# Patient Record
Sex: MALE | Race: BLACK OR AFRICAN AMERICAN | NOT HISPANIC OR LATINO | URBAN - METROPOLITAN AREA
[De-identification: names, ages, dates, MRNs, and addresses within clinical notes are randomized per-mention and may not be internally consistent; named-entity substitution may affect disease eponyms.]

---

## 2019-06-22 ENCOUNTER — EMERGENCY (EMERGENCY)
Facility: HOSPITAL | Age: 24
LOS: 1 days | Discharge: HOME | End: 2019-06-22
Attending: EMERGENCY MEDICINE | Admitting: EMERGENCY MEDICINE
Payer: COMMERCIAL

## 2019-06-22 VITALS
TEMPERATURE: 97 F | RESPIRATION RATE: 16 BRPM | DIASTOLIC BLOOD PRESSURE: 74 MMHG | WEIGHT: 210.1 LBS | OXYGEN SATURATION: 100 % | HEART RATE: 64 BPM | SYSTOLIC BLOOD PRESSURE: 125 MMHG

## 2019-06-22 VITALS
SYSTOLIC BLOOD PRESSURE: 120 MMHG | DIASTOLIC BLOOD PRESSURE: 75 MMHG | HEART RATE: 83 BPM | TEMPERATURE: 98 F | RESPIRATION RATE: 16 BRPM | OXYGEN SATURATION: 100 %

## 2019-06-22 PROCEDURE — 71046 X-RAY EXAM CHEST 2 VIEWS: CPT | Mod: 26

## 2019-06-22 PROCEDURE — 99283 EMERGENCY DEPT VISIT LOW MDM: CPT | Mod: 25

## 2019-06-22 NOTE — ED ADULT TRIAGE NOTE - CHIEF COMPLAINT QUOTE
Pt st" I been having chest pains  like a pressure/ heaviness coming and going since yesterday then today seems more consistant and much more frequent lasts a second worse when bending over." pt st" Presenlty pain is gone."  Denies nausea , denies shortness of breath.

## 2019-06-22 NOTE — ED ADULT NURSE NOTE - OBJECTIVE STATEMENT
Flode[place] RN note: Pt sitting quietly with Mother at bedside. Pt calm pleasant affect pt st" I been having heaviness on and off since yesterday , no pain now," Pt denies shortness of breath, no nausea, no sweating. MD Will at bedside. Pt st"Pain actually started at work after lifting boxes." Pt endorsing that his Mom can remain in room for exam and further questions. Placed on cm vss, postioned for comfort. call bell in reach. As per MD Will pain most likely Muscular,  treatment plan to include xray, No labs at this time... Handoff rpt to Primary RN Sara to follow.

## 2019-06-22 NOTE — ED ADULT NURSE NOTE - NSIMPLEMENTINTERV_GEN_ALL_ED
Implemented All Universal Safety Interventions:  [place] to call system. Call bell, personal items and telephone within reach. Instruct patient to call for assistance. Room bathroom lighting operational. Non-slip footwear when patient is off stretcher. Physically safe environment: no spills, clutter or unnecessary equipment. Stretcher in lowest position, wheels locked, appropriate side rails in place.

## 2019-06-22 NOTE — ED PROVIDER NOTE - NSFOLLOWUPINSTRUCTIONS_ED_ALL_ED_FT
Take motrin 600mg every 6 hours for pain  Return to ER for trouble breathing, worsening pain or any other complaints in which you feel you require immediate attention.

## 2019-06-22 NOTE — ED PROVIDER NOTE - CHIEF COMPLAINT
Recorded as Task  Date: 08/21/2018 10:06 AM, Created By: Jessica Heredia  Task Name: 4. Patient Message  Assigned To: DELGADO PANDEY  Regarding Patient: BEATRICE CARDONA, Status: Active  Comment:   Jessica Heredia - CST     The patient is a 23y Male complaining of chest pressure.

## 2019-06-22 NOTE — ED PROVIDER NOTE - OBJECTIVE STATEMENT
23 yr old male with no sig PMH presents with chest pain while lifting boxes.  Denies drug use, family hx of SCD.  Now pain has resolved.  PERC negative.  Denies SOB or diaphoresis during this.  Has had in the past.

## 2019-06-26 PROBLEM — Z78.9 OTHER SPECIFIED HEALTH STATUS: Chronic | Status: ACTIVE | Noted: 2019-06-22

## 2019-07-08 ENCOUNTER — APPOINTMENT (OUTPATIENT)
Dept: OPHTHALMOLOGY | Facility: CLINIC | Age: 24
End: 2019-07-08
Payer: COMMERCIAL

## 2019-07-08 ENCOUNTER — NON-APPOINTMENT (OUTPATIENT)
Age: 24
End: 2019-07-08

## 2019-07-08 PROCEDURE — 92015 DETERMINE REFRACTIVE STATE: CPT

## 2019-07-08 PROCEDURE — 92004 COMPRE OPH EXAM NEW PT 1/>: CPT

## 2019-07-18 ENCOUNTER — APPOINTMENT (OUTPATIENT)
Dept: OPHTHALMOLOGY | Facility: CLINIC | Age: 24
End: 2019-07-18

## 2020-02-20 NOTE — ED PROVIDER NOTE - CARDIAC, MLM
----- Message from Jose Woodard Normal rate, regular rhythm.  Heart sounds S1, S2.  No murmurs, rubs or gallops.

## 2020-09-14 NOTE — ED ADULT NURSE NOTE - HEART RATE (BEATS/MIN)
Physician Pre-Sedation Assessment    Pre-Sedation Assessment:    Sedation History: Previous Sedation with No Complications and Airway Assessed    Cardiac: normal S1, S2  Respiratory: breath sounds clear bilaterally   Abdomen: soft, BS (+), non-tender    AS 66